# Patient Record
Sex: MALE | Race: WHITE | NOT HISPANIC OR LATINO | URBAN - NONMETROPOLITAN AREA
[De-identification: names, ages, dates, MRNs, and addresses within clinical notes are randomized per-mention and may not be internally consistent; named-entity substitution may affect disease eponyms.]

---

## 2024-10-25 ENCOUNTER — APPOINTMENT (OUTPATIENT)
Dept: PRIMARY CARE | Facility: CLINIC | Age: 59
End: 2024-10-25
Payer: COMMERCIAL

## 2025-05-08 ENCOUNTER — OFFICE VISIT (OUTPATIENT)
Dept: URGENT CARE | Facility: CLINIC | Age: 60
End: 2025-05-08

## 2025-05-08 VITALS
HEART RATE: 71 BPM | SYSTOLIC BLOOD PRESSURE: 143 MMHG | TEMPERATURE: 97.7 F | BODY MASS INDEX: 28.14 KG/M2 | OXYGEN SATURATION: 97 % | HEIGHT: 69 IN | DIASTOLIC BLOOD PRESSURE: 81 MMHG | WEIGHT: 190 LBS

## 2025-05-08 DIAGNOSIS — J40 BRONCHITIS: Primary | ICD-10-CM

## 2025-05-08 PROCEDURE — 99213 OFFICE O/P EST LOW 20 MIN: CPT | Performed by: NURSE PRACTITIONER

## 2025-05-08 RX ORDER — METHYLPREDNISOLONE 4 MG/1
TABLET ORAL
Qty: 21 TABLET | Refills: 0 | Status: SHIPPED | OUTPATIENT
Start: 2025-05-08 | End: 2025-05-14

## 2025-05-08 RX ORDER — BROMPHENIRAMINE MALEATE, PSEUDOEPHEDRINE HYDROCHLORIDE, AND DEXTROMETHORPHAN HYDROBROMIDE 2; 30; 10 MG/5ML; MG/5ML; MG/5ML
5 SYRUP ORAL 4 TIMES DAILY PRN
Qty: 120 ML | Refills: 0 | Status: SHIPPED | OUTPATIENT
Start: 2025-05-08 | End: 2025-05-18

## 2025-05-08 RX ORDER — ALBUTEROL SULFATE 90 UG/1
2 INHALANT RESPIRATORY (INHALATION) EVERY 4 HOURS PRN
Qty: 18 G | Refills: 0 | Status: SHIPPED | OUTPATIENT
Start: 2025-05-08 | End: 2025-06-07

## 2025-05-08 RX ORDER — AZITHROMYCIN 250 MG/1
TABLET, FILM COATED ORAL
Qty: 6 TABLET | Refills: 0 | Status: SHIPPED | OUTPATIENT
Start: 2025-05-08

## 2025-05-08 NOTE — LETTER
May 8, 2025     Patient: Humble May   YOB: 1965   Date of Visit: 5/8/2025       To Whom It May Concern:    It is my medical opinion that Humble May may return to work on 5/10/2025.    If you have any questions or concerns, please don't hesitate to call.         Sincerely,        Yoselin Encarnacion, ESME-CNP    CC: No Recipients

## 2025-05-08 NOTE — PROGRESS NOTES
[facility]   PAULETTE NOTE:      Name: Humble May, 59 y.o.    CSN:[id number]   MRN:[id number]      ALL:  Allergies[1]      Chief Complaint: URI (Cough, sinus congestion, headache X 4 days)    Encounter Date: 5/8/2025      HPI: The history was obtained from the patient. Humble is a 59 y.o. male, who presents with a chief complaint of cough that initially started as a dry, non-productive cough and has since progressed to a productive cough with yellowish sputum. The patient reports associated symptoms of , mild dyspnea on exertion, body aches, and fatigue. Denies hemoptysis, wheezing, or pleuritic chest pain. No recent travel or sick contacts. No history of asthma, COPD, or other chronic respiratory conditions. The patient denies chills, night sweats, significant weight loss, or exposure to tuberculosis. No recent antibiotic use. Symptoms have been gradually improving but persist, prompting today's visit.     PMHx:    Medical History[2]        Current Medications[3]      PMSx:  Surgical History[4]    Fam Hx: Family History[5]    SOC. Hx:     Social History     Socioeconomic History    Marital status:      Spouse name: Not on file    Number of children: Not on file    Years of education: Not on file    Highest education level: Not on file   Occupational History    Not on file   Tobacco Use    Smoking status: Not on file    Smokeless tobacco: Not on file   Substance and Sexual Activity    Alcohol use: Not on file    Drug use: Not on file    Sexual activity: Not on file   Other Topics Concern    Not on file   Social History Narrative    Not on file     Social Drivers of Health     Financial Resource Strain: Not on file   Food Insecurity: Not on file   Transportation Needs: Not on file   Physical Activity: Not on file   Stress: Not on file   Social Connections: Not on file   Intimate Partner Violence: Not on file   Housing Stability: Not on file         Vitals:    05/08/25 0931   BP: 143/81    Pulse: 71   Temp: 36.5 °C (97.7 °F)   SpO2: 97%     86.2 kg (190 lb)          Physical Exam  Vitals and nursing note reviewed.   Constitutional:       General: He is not in acute distress.     Appearance: Normal appearance. He is not ill-appearing.   HENT:      Head: Normocephalic and atraumatic.      Right Ear: Hearing, tympanic membrane, ear canal and external ear normal.      Left Ear: Hearing, tympanic membrane, ear canal and external ear normal.      Nose: Congestion and rhinorrhea present.      Right Sinus: No maxillary sinus tenderness or frontal sinus tenderness.      Left Sinus: No maxillary sinus tenderness or frontal sinus tenderness.      Mouth/Throat:      Lips: Pink.      Mouth: Mucous membranes are moist.      Pharynx: Uvula midline. Pharyngeal swelling and posterior oropharyngeal erythema present. No oropharyngeal exudate.      Tonsils: No tonsillar exudate.   Cardiovascular:      Rate and Rhythm: Normal rate and regular rhythm.      Heart sounds: Normal heart sounds.   Pulmonary:      Effort: Pulmonary effort is normal.      Breath sounds: Examination of the right-upper field reveals wheezing. Examination of the left-upper field reveals wheezing. Wheezing present.      Comments: Faint expiratory wheezing   Abdominal:      General: Bowel sounds are normal.   Skin:     General: Skin is warm and dry.      Capillary Refill: Capillary refill takes less than 2 seconds.   Neurological:      Mental Status: He is alert and oriented to person, place, and time.       ____________________________________________________________________    I did personally review Humble's past medical history, surgical history, social history, as well as family history (when relevant).  In this case, I also oversaw the his drug management by reviewing his medication list, allergy list, as well as the medications that I prescribed during the UC course and/or recommended as an out-patient (including possible OTC medications such  as acetaminophen, NSAIDs , etc).    After reviewing the items above, I did look at previous medical documentation, such as recent hospitalizations, office visits, and/or recent consultations with PCP/specialist.                          SDOH:   Another factor that I considered in Humble's care was his Social Determinants of Health (SDOH). During this  encounter, he did not have social determinants of health. Those SDOH influencing Humble's care are: none      _____________________________________________________________________       COURSE/MEDICAL DECISION MAKING:    Humble is a 59 y.o., who presents with a working diagnosis of   1. Bronchitis     with a differential to include: pneumonia, asthma, COPD, sinusitis, GERD, common cold, influenza.     Plan of Care:  1) Z-Angel empirically  2) Medrol Dosepak to decrease inflammation, previously tolerated  3) albuterol inhaler as needed for wheezing/shortness of breath  4) Bromfed as needed for cough  5) Continue over-the-counter supportive care, increased oral fluids, and humidified air.   6) Return to urgent care, primary care provider, or emergency department with worsening symptoms      No red flags on exam. Plan of care reviewed with patient, agreeable to discharge      RASHAUN Encarnacion DNP   Advanced Practice Provider  [facility]    Please note: While the patient may or may not have received printed discharge paperwork, all relevant medical findings, test results, and treatment details are accessible through the electronic medical record system. The patient is encouraged to review their chart via the patient portal for comprehensive information and follow-up instructions.         [1]   Allergies  Allergen Reactions    Cortisone Unknown     hiccups   [2] No past medical history on file.  [3]   Current Outpatient Medications   Medication Sig Dispense Refill    albuterol 90 mcg/actuation inhaler Inhale 2 puffs every 4 hours if needed for wheezing or  shortness of breath. 18 g 0    azithromycin (Zithromax Z-Angel) 250 mg tablet Take 2 tablets (500 mg) on  Day 1,  followed by 1 tablet (250 mg) once daily on Days 2 through 5. 6 tablet 0    brompheniramine-pseudoeph-DM 2-30-10 mg/5 mL syrup Take 5 mL by mouth 4 times a day as needed for congestion or cough for up to 10 days. 120 mL 0    methylPREDNISolone (Medrol Dospak) 4 mg tablets Follow schedule on package instructions 21 tablet 0     No current facility-administered medications for this visit.   [4] No past surgical history on file.  [5] No family history on file.

## 2025-05-30 DIAGNOSIS — J40 BRONCHITIS: ICD-10-CM

## 2025-06-02 RX ORDER — ALBUTEROL SULFATE 90 UG/1
2 INHALANT RESPIRATORY (INHALATION) EVERY 4 HOURS PRN
OUTPATIENT
Start: 2025-06-02 | End: 2025-07-02